# Patient Record
Sex: MALE | Race: WHITE | HISPANIC OR LATINO | URBAN - METROPOLITAN AREA
[De-identification: names, ages, dates, MRNs, and addresses within clinical notes are randomized per-mention and may not be internally consistent; named-entity substitution may affect disease eponyms.]

---

## 2019-06-27 ENCOUNTER — EMERGENCY (EMERGENCY)
Facility: HOSPITAL | Age: 39
LOS: 0 days | Discharge: HOME | End: 2019-06-27
Admitting: EMERGENCY MEDICINE
Payer: MEDICAID

## 2019-06-27 VITALS
HEART RATE: 122 BPM | OXYGEN SATURATION: 98 % | RESPIRATION RATE: 18 BRPM | DIASTOLIC BLOOD PRESSURE: 90 MMHG | TEMPERATURE: 100 F | SYSTOLIC BLOOD PRESSURE: 135 MMHG

## 2019-06-27 DIAGNOSIS — R30.0 DYSURIA: ICD-10-CM

## 2019-06-27 DIAGNOSIS — R31.9 HEMATURIA, UNSPECIFIED: ICD-10-CM

## 2019-06-27 DIAGNOSIS — Z79.2 LONG TERM (CURRENT) USE OF ANTIBIOTICS: ICD-10-CM

## 2019-06-27 DIAGNOSIS — N39.0 URINARY TRACT INFECTION, SITE NOT SPECIFIED: ICD-10-CM

## 2019-06-27 LAB
APPEARANCE UR: ABNORMAL
BACTERIA # UR AUTO: ABNORMAL /HPF
BILIRUB UR-MCNC: ABNORMAL
COLOR SPEC: ABNORMAL
DIFF PNL FLD: ABNORMAL
GLUCOSE UR QL: 100 MG/DL
KETONES UR-MCNC: >=80
LEUKOCYTE ESTERASE UR-ACNC: ABNORMAL
NITRITE UR-MCNC: POSITIVE
PH UR: 5.5 — SIGNIFICANT CHANGE UP (ref 5–8)
PROT UR-MCNC: >=300 MG/DL
RBC CASTS # UR COMP ASSIST: >50 /HPF
SP GR SPEC: 1.02 — SIGNIFICANT CHANGE UP (ref 1.01–1.03)
UROBILINOGEN FLD QL: 2 MG/DL (ref 0.2–0.2)
WBC UR QL: SIGNIFICANT CHANGE UP /HPF

## 2019-06-27 PROCEDURE — 99283 EMERGENCY DEPT VISIT LOW MDM: CPT

## 2019-06-27 RX ORDER — CEFDINIR 250 MG/5ML
1 POWDER, FOR SUSPENSION ORAL
Qty: 20 | Refills: 0
Start: 2019-06-27 | End: 2019-07-06

## 2019-06-27 NOTE — ED PROVIDER NOTE - NS ED ROS FT
Review of Systems:  	•	CONSTITUTIONAL - no fever, no diaphoresis, no chills  	•	SKIN - no rash  	•	EYES - no eye pain, no blurry vision  	•	ENT - no change in hearing, no sore throat, no ear pain or tinnitus  	•	RESPIRATORY - no shortness of breath, no cough  	•	CARDIAC - no chest pain, no palpitations  	•	GI - no abd pain, no nausea, no vomiting, no diarrhea, no constipation  	•	GENITO-URINARY - no discharge, + dysuria; + hematuria, no increased urinary frequency  	•	MUSCULOSKELETAL - no joint paint, no swelling, no redness  	•	NEUROLOGIC - no weakness, no headache, no paresthesias, no LOC  	•

## 2019-06-27 NOTE — ED PROVIDER NOTE - CARE PROVIDER_API CALL
Sheron Garcia)  Urology  [address] [place] [address], [address]  [place] [address]  Phone: [phone]  Fax: [phone]  Follow Up Time:

## 2019-06-27 NOTE — ED PROVIDER NOTE - CLINICAL SUMMARY MEDICAL DECISION MAKING FREE TEXT BOX
+ nitrites and blood on urine, will treat for UTI, and follow up with URO. urine culture sent.. pt given return precautions

## 2019-06-27 NOTE — ED PROVIDER NOTE - NSFOLLOWUPCLINICS_GEN_ALL_ED_FT
[facility]  Urology  .  [place]   Phone: [phone]  Fax:   Follow Up Time:

## 2019-06-27 NOTE — ED PROVIDER NOTE - PHYSICAL EXAMINATION
CONST: Well appearing in NAD  EYES: PERRL, EOMI, Sclera and conjunctiva clear.   ENT: No nasal discharge. TM's clear B/L without drainage. Oropharynx normal appearing, no erythema or exudates.   NECK: Non-tender, no meningeal signs. normal ROM. supple   CARD: Normal S1 S2; Normal rate and rhythm  RESP: Equal BS B/L, No wheezes, rhonchi or rales. No distress  GI: Soft, non-tender, non-distended. no cva tenderness. normal BS  MS: Normal ROM in all extremities. No midline spinal tenderness. pulses 2 +. no calf tenderness or swelling  SKIN: Warm, dry, no acute rashes. Good turgor

## 2019-06-27 NOTE — ED PROVIDER NOTE - OBJECTIVE STATEMENT
38 year old male with no pmhx presents with dysuria and hematuria x 1 day. Pt 38 year old male with no pmhx presents with dysuria and hematuria x 1 day. Pt denies penile discharge, testicular pain or swelling, abdominal or flank pain, N/V/D, fever, chills, urinary frequency or urgency. + sexually active with protection.

## 2019-06-28 PROBLEM — Z00.00 ENCOUNTER FOR PREVENTIVE HEALTH EXAMINATION: Status: ACTIVE | Noted: 2019-06-28

## 2019-06-28 LAB
C TRACH RRNA SPEC QL NAA+PROBE: SIGNIFICANT CHANGE UP
N GONORRHOEA RRNA SPEC QL NAA+PROBE: SIGNIFICANT CHANGE UP
SPECIMEN SOURCE: SIGNIFICANT CHANGE UP

## 2019-06-30 LAB
-  AMIKACIN: SIGNIFICANT CHANGE UP
-  AMPICILLIN/SULBACTAM: SIGNIFICANT CHANGE UP
-  AMPICILLIN: SIGNIFICANT CHANGE UP
-  AZTREONAM: SIGNIFICANT CHANGE UP
-  CEFEPIME: SIGNIFICANT CHANGE UP
-  CEFOTAXIME: SIGNIFICANT CHANGE UP
-  CEFOXITIN: SIGNIFICANT CHANGE UP
-  CEFTAZIDIME: SIGNIFICANT CHANGE UP
-  CEFTRIAXONE: SIGNIFICANT CHANGE UP
-  CEFUROXIME: SIGNIFICANT CHANGE UP
-  CIPROFLOXACIN: SIGNIFICANT CHANGE UP
-  ERTAPENEM: SIGNIFICANT CHANGE UP
-  GENTAMICIN: SIGNIFICANT CHANGE UP
-  IMIPENEM: SIGNIFICANT CHANGE UP
-  LEVOFLOXACIN: SIGNIFICANT CHANGE UP
-  MEROPENEM: SIGNIFICANT CHANGE UP
-  NITROFURANTOIN: SIGNIFICANT CHANGE UP
-  PIPERACILLIN/TAZOBACTAM: SIGNIFICANT CHANGE UP
-  TIGECYCLINE: SIGNIFICANT CHANGE UP
-  TOBRAMYCIN: SIGNIFICANT CHANGE UP
-  TRIMETHOPRIM/SULFAMETHOXAZOLE: SIGNIFICANT CHANGE UP
CULTURE RESULTS: SIGNIFICANT CHANGE UP
METHOD TYPE: SIGNIFICANT CHANGE UP
ORGANISM # SPEC MICROSCOPIC CNT: SIGNIFICANT CHANGE UP
ORGANISM # SPEC MICROSCOPIC CNT: SIGNIFICANT CHANGE UP
SPECIMEN SOURCE: SIGNIFICANT CHANGE UP

## 2019-08-22 ENCOUNTER — APPOINTMENT (OUTPATIENT)
Age: 39
End: 2019-08-22

## 2019-10-17 ENCOUNTER — OUTPATIENT (OUTPATIENT)
Dept: OUTPATIENT SERVICES | Facility: HOSPITAL | Age: 39
LOS: 1 days | Discharge: HOME | End: 2019-10-17

## 2019-10-17 ENCOUNTER — APPOINTMENT (OUTPATIENT)
Age: 39
End: 2019-10-17
Payer: MEDICAID

## 2019-10-17 VITALS
HEIGHT: 71 IN | WEIGHT: 150 LBS | SYSTOLIC BLOOD PRESSURE: 121 MMHG | HEART RATE: 86 BPM | DIASTOLIC BLOOD PRESSURE: 73 MMHG | BODY MASS INDEX: 21 KG/M2

## 2019-10-17 DIAGNOSIS — Z78.9 OTHER SPECIFIED HEALTH STATUS: ICD-10-CM

## 2019-10-17 PROCEDURE — 99203 OFFICE O/P NEW LOW 30 MIN: CPT

## 2019-10-17 NOTE — LETTER HEADER
[FreeTextEntry3] : Sheron Garcia M.D.\par Director of Urology\par [facility]\par [address] [place] [address], [address]\[place] [address]

## 2019-10-17 NOTE — PHYSICAL EXAM
[Normal Appearance] : normal appearance [General Appearance - Well Nourished] : well nourished [General Appearance - Well Developed] : well developed [Well Groomed] : well groomed [General Appearance - In No Acute Distress] : no acute distress [Heart Rate And Rhythm] : Heart rate and rhythm were normal [Edema] : no peripheral edema [Respiration, Rhythm And Depth] : normal respiratory rhythm and effort [Auscultation Breath Sounds / Voice Sounds] : lungs were clear to auscultation bilaterally [Exaggerated Use Of Accessory Muscles For Inspiration] : no accessory muscle use [Bowel Sounds] : normal bowel sounds [Abdomen Soft] : soft [Costovertebral Angle Tenderness] : no ~M costovertebral angle tenderness [Abdomen Tenderness] : non-tender [Urethral Meatus] : meatus normal [Penis Abnormality] : normal circumcised penis [Urinary Bladder Findings] : the bladder was normal on palpation [Scrotum] : the scrotum was normal [Testes Tenderness] : no tenderness of the testes [Testes Mass (___cm)] : there were no testicular masses [Prostate Enlargement] : the prostate was not enlarged [Prostate Tenderness] : the prostate was not tender [No Prostate Nodules] : no prostate nodules [Normal Station and Gait] : the gait and station were normal for the patient's age [] : no rash [No Focal Deficits] : no focal deficits [Oriented To Time, Place, And Person] : oriented to person, place, and time [Affect] : the affect was normal [Mood] : the mood was normal [Not Anxious] : not anxious [No Palpable Adenopathy] : no palpable adenopathy [FreeTextEntry1] : Right small spermatocele. Digital rectal examination shows minimal palpable prostate with good rectal tone and positive BC reflex.

## 2019-10-17 NOTE — ASSESSMENT
[FreeTextEntry1] : He had an episode of painful gross hematuria which resolved on antibiotic therapy. Cultures showed Escherichia coli. He has some minimally bothersome voiding symptoms, including feelings of incomplete bladder emptying. We discussed the options available and he will obtain a renal/bladder ultrasound, keep a voiding diary, and follow for possible uroflow study. Additionally UA C&S will be sent from the office as he is complaining of mild dysuria today.\par Re-the folliculitis pubis I recommended one stop using a razor even on scrotum and two if he’s using a shaver leave more of a piece of hair so it is less likely to curl underneath especially given his ethnic background where the hair tends to be coiled

## 2019-10-17 NOTE — LETTER BODY
[Please see my note below.] : Please see my note below. [Consult Letter:] : I had the pleasure of evaluating your patient, [unfilled]. [Sincerely,] : Sincerely, [Consult Closing:] : Thank you very much for allowing me to participate in the care of this patient.  If you have any questions, please do not hesitate to contact me. [FreeTextEntry2] : To Whom It May Concern

## 2019-10-17 NOTE — HISTORY OF PRESENT ILLNESS
[Dysuria] : dysuria [Hematuria - Gross] : gross hematuria [None] : None [FreeTextEntry1] : Collin is a 39-year-old male, with no significant past medical history, who presented to the ER in June complaining of gross hematuria with dysuria. Urine testing showed nitrite positive urine the patient was given Ceftinir b.i.d. x10 days. Urine culture showed pan sensitive Escherichia coli. His symptoms resolved approximately 2 days after starting antibiotic treatment. Today he presents complaining of some intermittent mild dysuria. He is an [employer]  and frequently holds his urine for long periods of time.\par \par He also reports intermittent feelings of incomplete bladder emptying with occasional decrease force of urinary stream \par \par He denies any history of trauma to the area, catheterizations, surgeries, or  history. He denies renal stones.

## 2019-10-18 DIAGNOSIS — R30.0 DYSURIA: ICD-10-CM

## 2019-10-18 DIAGNOSIS — N39.0 URINARY TRACT INFECTION, SITE NOT SPECIFIED: ICD-10-CM

## 2019-10-18 LAB
APPEARANCE: CLEAR
BILIRUBIN URINE: NEGATIVE
BLOOD URINE: NEGATIVE
COLOR: YELLOW
GLUCOSE QUALITATIVE U: NEGATIVE
KETONES URINE: NEGATIVE
LEUKOCYTE ESTERASE URINE: NEGATIVE
NITRITE URINE: NEGATIVE
PH URINE: 7
PROTEIN URINE: NORMAL
SPECIFIC GRAVITY URINE: 1.02
UROBILINOGEN URINE: NORMAL

## 2019-10-21 LAB — BACTERIA UR CULT: NORMAL

## 2019-10-22 LAB — URINE CYTOLOGY: NORMAL

## 2019-11-05 ENCOUNTER — OUTPATIENT (OUTPATIENT)
Dept: OUTPATIENT SERVICES | Facility: HOSPITAL | Age: 39
LOS: 1 days | Discharge: HOME | End: 2019-11-05
Payer: MEDICAID

## 2019-11-05 DIAGNOSIS — N39.0 URINARY TRACT INFECTION, SITE NOT SPECIFIED: ICD-10-CM

## 2019-11-05 PROCEDURE — 76770 US EXAM ABDO BACK WALL COMP: CPT | Mod: 26

## 2019-12-12 ENCOUNTER — APPOINTMENT (OUTPATIENT)
Dept: UROLOGY | Facility: CLINIC | Age: 39
End: 2019-12-12
Payer: MEDICAID

## 2019-12-12 VITALS
WEIGHT: 150 LBS | SYSTOLIC BLOOD PRESSURE: 129 MMHG | HEIGHT: 71 IN | HEART RATE: 100 BPM | BODY MASS INDEX: 21 KG/M2 | DIASTOLIC BLOOD PRESSURE: 75 MMHG

## 2019-12-12 DIAGNOSIS — Z87.898 PERSONAL HISTORY OF OTHER SPECIFIED CONDITIONS: ICD-10-CM

## 2019-12-12 DIAGNOSIS — A49.9 URINARY TRACT INFECTION, SITE NOT SPECIFIED: ICD-10-CM

## 2019-12-12 DIAGNOSIS — R39.12 POOR URINARY STREAM: ICD-10-CM

## 2019-12-12 DIAGNOSIS — N39.0 URINARY TRACT INFECTION, SITE NOT SPECIFIED: ICD-10-CM

## 2019-12-12 PROCEDURE — 99213 OFFICE O/P EST LOW 20 MIN: CPT

## 2019-12-12 PROCEDURE — 51741 ELECTRO-UROFLOWMETRY FIRST: CPT

## 2019-12-12 PROCEDURE — 51798 US URINE CAPACITY MEASURE: CPT

## 2019-12-12 NOTE — LETTER HEADER
[FreeTextEntry3] : Sheron Garcia M.D.\par Director of Urology\par [facility]\par [address] [place] [address], [address]\[place] [address]

## 2019-12-12 NOTE — HISTORY OF PRESENT ILLNESS
[Dysuria] : dysuria [Hematuria - Gross] : gross hematuria [None] : None [FreeTextEntry1] : Collin is a 39-year-old male, with no significant past medical history, who presented to the ER in June complaining of gross hematuria with dysuria. Urine testing showed nitrite positive urine the patient was given Ceftinir b.i.d. x10 days. \par \par Post antibiotic use he states his symptoms have resolved. He reports pushing/straining to initiate a stream however, once he starts it comes out easy. He admits to diminished force of stream.\par \par He presents today to review his uroflow study, post and urine cultures, and renal/bladder ultrasound. He did not keep a voiding diary. He is an [employer]  and frequently holds his urine for long periods of time.\par

## 2019-12-12 NOTE — LETTER BODY
[Please see my note below.] : Please see my note below. [Sincerely,] : Sincerely, [Courtesy Letter:] : I had the pleasure of seeing your patient, [unfilled], in my office today. [FreeTextEntry2] : To Whom It May Concern

## 2019-12-12 NOTE — PHYSICAL EXAM
[Normal Appearance] : normal appearance [General Appearance - Well Developed] : well developed [General Appearance - Well Nourished] : well nourished [General Appearance - In No Acute Distress] : no acute distress [Edema] : no peripheral edema [Well Groomed] : well groomed [] : no respiratory distress [Respiration, Rhythm And Depth] : normal respiratory rhythm and effort [Exaggerated Use Of Accessory Muscles For Inspiration] : no accessory muscle use [Mood] : the mood was normal [Affect] : the affect was normal [Oriented To Time, Place, And Person] : oriented to person, place, and time [Not Anxious] : not anxious [No Focal Deficits] : no focal deficits [Normal Station and Gait] : the gait and station were normal for the patient's age

## 2019-12-12 NOTE — ASSESSMENT
[FreeTextEntry1] : His renal/bladder ultrasound are unremarkable and his post antibiotic urine culture is also negative. His symptoms have mostly resolved however he has some pushing to start urination. Question is why did he get a UTI in the first place.\par He did not bring in a record of his intake and output so I can’t say if any of this secondary to behavior modification. We went ahead with the uroflow anyway, and his uroflow study is mostly within normal limits however there is some minimally decreased force of stream.\par He works as an [employer]  and holds his urine for long periods of time. He may have trained himself to hold his urine and now has trouble not. We recommended keeping a voiding diary now for 24 hours. He would then start performing reverse Kegel exercises, and keep a voiding diary 4 weeks after. He'll follow up in 6 weeks for discussion

## 2019-12-14 ENCOUNTER — EMERGENCY (EMERGENCY)
Facility: HOSPITAL | Age: 39
LOS: 0 days | Discharge: HOME | End: 2019-12-14
Admitting: EMERGENCY MEDICINE
Payer: MEDICAID

## 2019-12-14 VITALS
RESPIRATION RATE: 18 BRPM | WEIGHT: 149.91 LBS | TEMPERATURE: 98 F | HEART RATE: 100 BPM | SYSTOLIC BLOOD PRESSURE: 136 MMHG | DIASTOLIC BLOOD PRESSURE: 104 MMHG | OXYGEN SATURATION: 100 %

## 2019-12-14 DIAGNOSIS — T78.1XXA OTHER ADVERSE FOOD REACTIONS, NOT ELSEWHERE CLASSIFIED, INITIAL ENCOUNTER: ICD-10-CM
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
## 2019-12-14 DIAGNOSIS — R21 RASH AND OTHER NONSPECIFIC SKIN ERUPTION: ICD-10-CM
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
## 2019-12-14 PROCEDURE — 99283 EMERGENCY DEPT VISIT LOW MDM: CPT

## 2019-12-14 RX ORDER — FAMOTIDINE 10 MG/ML
40 INJECTION INTRAVENOUS ONCE
Refills: 0 | Status: COMPLETED | OUTPATIENT
Start: 2019-12-14 | End: 2019-12-14

## 2019-12-14 RX ORDER — EPINEPHRINE 0.3 MG/.3ML
0.3 INJECTION INTRAMUSCULAR; SUBCUTANEOUS
Qty: 1 | Refills: 0
Start: 2019-12-14 | End: 2019-12-14

## 2019-12-14 RX ORDER — DIPHENHYDRAMINE HCL 50 MG
50 CAPSULE ORAL ONCE
Refills: 0 | Status: COMPLETED | OUTPATIENT
Start: 2019-12-14 | End: 2019-12-14

## 2019-12-14 RX ADMIN — Medication 50 MILLIGRAM(S): at 01:13

## 2019-12-14 RX ADMIN — Medication 60 MILLIGRAM(S): at 01:13

## 2019-12-14 RX ADMIN — FAMOTIDINE 40 MILLIGRAM(S): 10 INJECTION INTRAVENOUS at 01:13

## 2019-12-14 NOTE — ED PROVIDER NOTE - NS ED ROS FT
Review of Systems:  	•	CONSTITUTIONAL - no fever, no diaphoresis, no chills  	•	SKIN - + rash  	•	HEMATOLOGIC - no bleeding, no bruising  	•	EYES - no eye pain, no blurry vision  	•	ENT - no change in hearing, no sore throat, no ear pain or tinnitus  	•	RESPIRATORY - no shortness of breath, no cough  	•	CARDIAC - no chest pain, no palpitations  	•	GI - no abd pain, no nausea, no vomiting, no diarrhea, no constipation  	•	GENITO-URINARY - no discharge, no dysuria; no hematuria, no increased urinary frequency  	•	MUSCULOSKELETAL - no joint paint, no swelling, no redness

## 2019-12-14 NOTE — ED PROVIDER NOTE - PATIENT PORTAL LINK FT
You can access the FollowMyHealth Patient Portal offered by [facility] by registering at the following website: [email or web address]. By joining [employer]’s FollowMyHealth portal, you will also be able to view your health information using other applications (apps) compatible with our system.

## 2019-12-14 NOTE — ED PROVIDER NOTE - PHYSICAL EXAMINATION
CONST: Well appearing in NAD  EYES: PERRL, EOMI, Sclera and conjunctiva clear.   ENT: No nasal discharge. Oropharynx normal appearing, no erythema or exudates. No abscess or swelling. Uvula midline.   NECK: Non-tender, no meningeal signs. normal ROM. supple   CARD: Normal S1 S2; Normal rate and rhythm  RESP: Equal BS B/L, No wheezes, rhonchi or rales. No distress  GI: Soft, non-tender, non-distended. no cva tenderness. normal BS  MS: Normal ROM in all extremities. No midline spinal tenderness. pulses 2 +  SKIN: + urticarial rash to face, neck, abdomen and back

## 2019-12-14 NOTE — ED PROVIDER NOTE - OBJECTIVE STATEMENT
39 year old male with pmhx noted, presents with hives to body x 1 hour. pt admits hives began after eating shrimp, which is is allergic to. pt denies throat tightness, swelling, cp, sob, abd pain, or N/V/D.

## 2019-12-14 NOTE — ED PROVIDER NOTE - CLINICAL SUMMARY MEDICAL DECISION MAKING FREE TEXT BOX
Pt with allergic reaction to shrimp, + hives. medications given with relief. pt discharged with prednisone and EPI-PEN. strict return precautions given to pt

## 2019-12-14 NOTE — ED PROVIDER NOTE - PROGRESS NOTE DETAILS
Pt feeling much better after medications, pt given strict return precautions. prednisone and EPI-PEN sent to pharmacy. pt understands to return to ED if symptoms do not resolve or worsen

## 2019-12-14 NOTE — ED ADULT TRIAGE NOTE - CHIEF COMPLAINT QUOTE
Pt came c/o allergic reaction after eating shrimps. Pt stated he has allergy to shellfish and eats shrimps sometimes but almost never gets any reaction. Pt stated he took Zyrtec.

## 2020-01-09 ENCOUNTER — OUTPATIENT (OUTPATIENT)
Dept: OUTPATIENT SERVICES | Facility: HOSPITAL | Age: 40
LOS: 1 days | Discharge: HOME | End: 2020-01-09

## 2020-01-22 ENCOUNTER — APPOINTMENT (OUTPATIENT)
Dept: UROLOGY | Facility: CLINIC | Age: 40
End: 2020-01-22

## 2020-12-21 ENCOUNTER — EMERGENCY (EMERGENCY)
Facility: HOSPITAL | Age: 40
LOS: 0 days | Discharge: HOME | End: 2020-12-21
Attending: EMERGENCY MEDICINE | Admitting: EMERGENCY MEDICINE
Payer: MEDICAID

## 2020-12-21 VITALS
SYSTOLIC BLOOD PRESSURE: 140 MMHG | OXYGEN SATURATION: 99 % | WEIGHT: 149.91 LBS | DIASTOLIC BLOOD PRESSURE: 80 MMHG | RESPIRATION RATE: 18 BRPM | HEART RATE: 118 BPM | TEMPERATURE: 98 F

## 2020-12-21 VITALS — RESPIRATION RATE: 18 BRPM | HEART RATE: 86 BPM | OXYGEN SATURATION: 98 %

## 2020-12-21 DIAGNOSIS — R30.0 DYSURIA: ICD-10-CM

## 2020-12-21 DIAGNOSIS — Z91.013 ALLERGY TO SEAFOOD: ICD-10-CM

## 2020-12-21 DIAGNOSIS — Z91.09 OTHER ALLERGY STATUS, OTHER THAN TO DRUGS AND BIOLOGICAL SUBSTANCES: ICD-10-CM

## 2020-12-21 LAB
APPEARANCE UR: CLEAR — SIGNIFICANT CHANGE UP
BILIRUB UR-MCNC: NEGATIVE — SIGNIFICANT CHANGE UP
COLOR SPEC: YELLOW — SIGNIFICANT CHANGE UP
DIFF PNL FLD: NEGATIVE — SIGNIFICANT CHANGE UP
GLUCOSE UR QL: NEGATIVE — SIGNIFICANT CHANGE UP
KETONES UR-MCNC: NEGATIVE — SIGNIFICANT CHANGE UP
LEUKOCYTE ESTERASE UR-ACNC: NEGATIVE — SIGNIFICANT CHANGE UP
NITRITE UR-MCNC: NEGATIVE — SIGNIFICANT CHANGE UP
PH UR: 6.5 — SIGNIFICANT CHANGE UP (ref 5–8)
PROT UR-MCNC: SIGNIFICANT CHANGE UP
SP GR SPEC: 1.02 — SIGNIFICANT CHANGE UP (ref 1.01–1.03)
UROBILINOGEN FLD QL: SIGNIFICANT CHANGE UP

## 2020-12-21 PROCEDURE — 99283 EMERGENCY DEPT VISIT LOW MDM: CPT

## 2020-12-21 NOTE — ED PROVIDER NOTE - PROGRESS NOTE DETAILS
Attending Note: 39 y/o M here for dysuria. Pt has had a UTI in the past and states this feel similar to another UTI with dysuria. Denies any discharge, fever, abd pain, or back pain. States that he has not been sexually active in over a year.  PE: ABD: soft, NT, no CVA tenderness.  exam preformed with MARTA Hurst: No testicular tenderness, normal circumcised male, no discharge. Impression: Dysuria. Plan: UA, urine culture. Pt here with dysuria. Urine within normal limits. Vital signs improved. Will d/c. Culture sent. Attending Note: 41 y/o M here for dysuria. Pt has had a UTI in the past and states this feel similar to another UTI. Pt reports dysuria. Denies any discharge, fever, abd pain, or back pain. States that he has not been sexually active in over a year.  PE: ABD: soft, NT, no CVA tenderness.  exam preformed with MARTA Hurst: No testicular tenderness, normal circumcised male, no discharge. Impression: Dysuria. Plan: UA, urine culture.

## 2020-12-21 NOTE — ED PROVIDER NOTE - OBJECTIVE STATEMENT
41 yo male, no pmh, presents to ed for dysuria, on and off 41 yo male, no pmh, presents to ed for dysuria, on and off, several days, had uti in past similar, burning, no specific pain or radiation. Denies sexual activity, testicular pain, abd pain, nv, hematuria, back pain.

## 2020-12-21 NOTE — ED PROVIDER NOTE - PATIENT PORTAL LINK FT
You can access the FollowMyHealth Patient Portal offered by [facility] by registering at the following website: [email or web address]. By joining [employer]’s FollowMyHealth portal, you will also be able to view your health information using other applications (apps) compatible with our system.

## 2020-12-21 NOTE — ED PROVIDER NOTE - CARE PROVIDER_API CALL
Ruy Mcdonald)  Urology  [address] [place] [address], [address]  [place] [address]  Phone: [phone]  Fax: [phone]  Follow Up Time: 1-3 Days

## 2020-12-21 NOTE — ED PROVIDER NOTE - PHYSICAL EXAMINATION
Physical Exam    Vital Signs: I have reviewed the initial vital signs.  Constitutional: well-nourished, appears stated age, no acute distress  Eyes: Conjunctiva pink, Sclera clear,   Cardiovascular: S1 and S2, regular rate, regular rhythm, well-perfused extremities, radial pulses equal and 2+  Respiratory: unlabored respiratory effort, clear to auscultation bilaterally no wheezing, rales and rhonchi  Gastrointestinal: soft, non-tender abdomen, no pulsatile mass, normal bowl sounds, no cvat  Musculoskeletal: supple neck, no lower extremity edema, no midline tenderness  Integumentary: warm, dry, no rash  Neurologic: awake, alert, nvi Physical Exam    Vital Signs: I have reviewed the initial vital signs.  Constitutional: well-nourished, appears stated age, no acute distress  Eyes: Conjunctiva pink, Sclera clear,   Cardiovascular: S1 and S2, regular rate, regular rhythm, well-perfused extremities, radial pulses equal and 2+  Respiratory: unlabored respiratory effort, clear to auscultation bilaterally no wheezing, rales and rhonchi  Gastrointestinal: soft, non-tender abdomen, no pulsatile mass, normal bowl sounds, no cvat  Musculoskeletal: supple neck, no lower extremity edema, no midline tenderness  Integumentary: warm, dry, no rash  Neurologic: awake, alert, nvi.

## 2020-12-21 NOTE — ED PROVIDER NOTE - NS ED ROS FT
Constitutional: (-) fever, (-) chills  Cardiovascular: (-) chest pain  Respiratory: (-) cough,  Gastrointestinal: (-) vomiting,  Musculoskeletal:  (-) back pain  Integumentary: (-) rash,  Neurological: (-) headache,   :  (+)dysuria,  (-) hematuria  Allergic/Immunologic: (-) pruritus

## 2020-12-22 LAB
C TRACH RRNA SPEC QL NAA+PROBE: SIGNIFICANT CHANGE UP
CULTURE RESULTS: NO GROWTH — SIGNIFICANT CHANGE UP
N GONORRHOEA RRNA SPEC QL NAA+PROBE: SIGNIFICANT CHANGE UP
SPECIMEN SOURCE: SIGNIFICANT CHANGE UP
SPECIMEN SOURCE: SIGNIFICANT CHANGE UP

## 2021-04-30 ENCOUNTER — OUTPATIENT (OUTPATIENT)
Dept: OUTPATIENT SERVICES | Facility: HOSPITAL | Age: 41
LOS: 1 days | Discharge: HOME | End: 2021-04-30

## 2021-04-30 PROBLEM — Z78.9 OTHER SPECIFIED HEALTH STATUS: Chronic | Status: ACTIVE | Noted: 2020-12-21

## 2022-02-22 ENCOUNTER — OUTPATIENT (OUTPATIENT)
Dept: OUTPATIENT SERVICES | Facility: HOSPITAL | Age: 42
LOS: 1 days | Discharge: HOME | End: 2022-02-22

## 2022-07-27 NOTE — ED PROVIDER NOTE - NS ED ATTENDING STATEMENT MOD
I have personally performed a face to face diagnostic evaluation on this patient. I have reviewed the ACP note and agree with the history, exam and plan of care, except as noted. Imiquimod Counseling:  I discussed with the patient the risks of imiquimod including but not limited to erythema, scaling, itching, weeping, crusting, and pain.  Patient understands that the inflammatory response to imiquimod is variable from person to person and was educated regarded proper titration schedule.  If flu-like symptoms develop, patient knows to discontinue the medication and contact us.

## 2022-10-17 ENCOUNTER — EMERGENCY (EMERGENCY)
Facility: HOSPITAL | Age: 42
LOS: 0 days | Discharge: HOME | End: 2022-10-17
Attending: EMERGENCY MEDICINE | Admitting: EMERGENCY MEDICINE

## 2022-10-17 VITALS
OXYGEN SATURATION: 98 % | HEART RATE: 110 BPM | TEMPERATURE: 99 F | DIASTOLIC BLOOD PRESSURE: 94 MMHG | RESPIRATION RATE: 18 BRPM | WEIGHT: 179.9 LBS | SYSTOLIC BLOOD PRESSURE: 137 MMHG

## 2022-10-17 VITALS — HEART RATE: 76 BPM

## 2022-10-17 DIAGNOSIS — K92.1 MELENA: ICD-10-CM

## 2022-10-17 DIAGNOSIS — J30.89 OTHER ALLERGIC RHINITIS: ICD-10-CM

## 2022-10-17 DIAGNOSIS — Z91.013 ALLERGY TO SEAFOOD: ICD-10-CM

## 2022-10-17 DIAGNOSIS — R19.7 DIARRHEA, UNSPECIFIED: ICD-10-CM

## 2022-10-17 DIAGNOSIS — R00.0 TACHYCARDIA, UNSPECIFIED: ICD-10-CM

## 2022-10-17 LAB
ALBUMIN SERPL ELPH-MCNC: 5 G/DL — SIGNIFICANT CHANGE UP (ref 3.5–5.2)
ALP SERPL-CCNC: 57 U/L — SIGNIFICANT CHANGE UP (ref 30–115)
ALT FLD-CCNC: 10 U/L — SIGNIFICANT CHANGE UP (ref 0–41)
ANION GAP SERPL CALC-SCNC: 8 MMOL/L — SIGNIFICANT CHANGE UP (ref 7–14)
APTT BLD: 27 SEC — SIGNIFICANT CHANGE UP (ref 27–39.2)
AST SERPL-CCNC: 17 U/L — SIGNIFICANT CHANGE UP (ref 0–41)
BASOPHILS # BLD AUTO: 0.03 K/UL — SIGNIFICANT CHANGE UP (ref 0–0.2)
BASOPHILS NFR BLD AUTO: 0.3 % — SIGNIFICANT CHANGE UP (ref 0–1)
BILIRUB DIRECT SERPL-MCNC: <0.2 MG/DL — SIGNIFICANT CHANGE UP (ref 0–0.3)
BILIRUB INDIRECT FLD-MCNC: >0.3 MG/DL — SIGNIFICANT CHANGE UP (ref 0.2–1.2)
BILIRUB SERPL-MCNC: 0.5 MG/DL — SIGNIFICANT CHANGE UP (ref 0.2–1.2)
BUN SERPL-MCNC: 10 MG/DL — SIGNIFICANT CHANGE UP (ref 10–20)
CALCIUM SERPL-MCNC: 9.3 MG/DL — SIGNIFICANT CHANGE UP (ref 8.4–10.5)
CHLORIDE SERPL-SCNC: 101 MMOL/L — SIGNIFICANT CHANGE UP (ref 98–110)
CO2 SERPL-SCNC: 29 MMOL/L — SIGNIFICANT CHANGE UP (ref 17–32)
CREAT SERPL-MCNC: 0.9 MG/DL — SIGNIFICANT CHANGE UP (ref 0.7–1.5)
EGFR: 111 ML/MIN/1.73M2 — SIGNIFICANT CHANGE UP
EOSINOPHIL # BLD AUTO: 0.04 K/UL — SIGNIFICANT CHANGE UP (ref 0–0.7)
EOSINOPHIL NFR BLD AUTO: 0.4 % — SIGNIFICANT CHANGE UP (ref 0–8)
GLUCOSE SERPL-MCNC: 93 MG/DL — SIGNIFICANT CHANGE UP (ref 70–99)
HCT VFR BLD CALC: 42.1 % — SIGNIFICANT CHANGE UP (ref 42–52)
HGB BLD-MCNC: 14.7 G/DL — SIGNIFICANT CHANGE UP (ref 14–18)
IMM GRANULOCYTES NFR BLD AUTO: 0.3 % — SIGNIFICANT CHANGE UP (ref 0.1–0.3)
INR BLD: 1.03 RATIO — SIGNIFICANT CHANGE UP (ref 0.65–1.3)
LIDOCAIN IGE QN: 17 U/L — SIGNIFICANT CHANGE UP (ref 7–60)
LYMPHOCYTES # BLD AUTO: 1.18 K/UL — LOW (ref 1.2–3.4)
LYMPHOCYTES # BLD AUTO: 12.1 % — LOW (ref 20.5–51.1)
MCHC RBC-ENTMCNC: 31.5 PG — HIGH (ref 27–31)
MCHC RBC-ENTMCNC: 34.9 G/DL — SIGNIFICANT CHANGE UP (ref 32–37)
MCV RBC AUTO: 90.3 FL — SIGNIFICANT CHANGE UP (ref 80–94)
MONOCYTES # BLD AUTO: 0.61 K/UL — HIGH (ref 0.1–0.6)
MONOCYTES NFR BLD AUTO: 6.3 % — SIGNIFICANT CHANGE UP (ref 1.7–9.3)
NEUTROPHILS # BLD AUTO: 7.84 K/UL — HIGH (ref 1.4–6.5)
NEUTROPHILS NFR BLD AUTO: 80.6 % — HIGH (ref 42.2–75.2)
NRBC # BLD: 0 /100 WBCS — SIGNIFICANT CHANGE UP (ref 0–0)
PLATELET # BLD AUTO: 396 K/UL — SIGNIFICANT CHANGE UP (ref 130–400)
POTASSIUM SERPL-MCNC: 4.4 MMOL/L — SIGNIFICANT CHANGE UP (ref 3.5–5)
POTASSIUM SERPL-SCNC: 4.4 MMOL/L — SIGNIFICANT CHANGE UP (ref 3.5–5)
PROT SERPL-MCNC: 7.2 G/DL — SIGNIFICANT CHANGE UP (ref 6–8)
PROTHROM AB SERPL-ACNC: 11.8 SEC — SIGNIFICANT CHANGE UP (ref 9.95–12.87)
RBC # BLD: 4.66 M/UL — LOW (ref 4.7–6.1)
RBC # FLD: 12.6 % — SIGNIFICANT CHANGE UP (ref 11.5–14.5)
SODIUM SERPL-SCNC: 138 MMOL/L — SIGNIFICANT CHANGE UP (ref 135–146)
WBC # BLD: 9.73 K/UL — SIGNIFICANT CHANGE UP (ref 4.8–10.8)
WBC # FLD AUTO: 9.73 K/UL — SIGNIFICANT CHANGE UP (ref 4.8–10.8)

## 2022-10-17 PROCEDURE — 99284 EMERGENCY DEPT VISIT MOD MDM: CPT

## 2022-10-17 RX ORDER — SODIUM CHLORIDE 9 MG/ML
1000 INJECTION INTRAMUSCULAR; INTRAVENOUS; SUBCUTANEOUS ONCE
Refills: 0 | Status: COMPLETED | OUTPATIENT
Start: 2022-10-17 | End: 2022-10-17

## 2022-10-17 RX ADMIN — SODIUM CHLORIDE 1000 MILLILITER(S): 9 INJECTION INTRAMUSCULAR; INTRAVENOUS; SUBCUTANEOUS at 03:11

## 2022-10-17 NOTE — ED PROVIDER NOTE - NSFOLLOWUPCLINICS_GEN_ALL_ED_FT
[facility]  Medicine  [address], [place]   Phone: [phone]  Fax:   Follow Up Time: 4-6 Days

## 2022-10-17 NOTE — ED PROVIDER NOTE - CARE PLAN
1 Principal Discharge DX:	Blood in stool  Assessment and plan of treatment:	Patient with scant blood in his stool at the end of diarrhea.  Abdomen is benign now so do not feel needs emergent imaging and has no abdominal pain.  Will check labs.  Minimally tachycardic but no consistent bleeding so doubt that this is from clinically significant GI bleed.  If heart rate improves and labs reassuring, feel he is likely stable for outpatient GI follow-up.  Like related to the amount of diarrhea that he had.

## 2022-10-17 NOTE — ED PROVIDER NOTE - NSFOLLOWUPINSTRUCTIONS_ED_ALL_ED_FT
Our Emergency Department Referral Coordinators will be reaching out ot you in the next 24-48 hours from 9:00am to 5:00pm (Monday to Friday) with a follow up appointment. Please expect a phone call from the hospital in that time frame. If you do not receive a call or if you have any questions or concerns, you can reach them at [phone] or [phone].     Follow up with your primary care doctor within 1 week.       Please return to the emergency department if you have new/changed/worsening abdominal pain, abdominal swelling, inability to tolerate food or drink, nausea, vomiting, bloody stool, bloody diarrhea, fevers >100.4 or other symptoms that you find concerning.

## 2022-10-17 NOTE — ED PROVIDER NOTE - PLAN OF CARE
Patient with scant blood in his stool at the end of diarrhea.  Abdomen is benign now so do not feel needs emergent imaging and has no abdominal pain.  Will check labs.  Minimally tachycardic but no consistent bleeding so doubt that this is from clinically significant GI bleed.  If heart rate improves and labs reassuring, feel he is likely stable for outpatient GI follow-up.  Like related to the amount of diarrhea that he had.

## 2022-10-17 NOTE — ED PROVIDER NOTE - PHYSICAL EXAMINATION
on exam, nontoxic, vs noted  Gen: Alert, NAD  Skin: Warm, dry, intact  Head: NCAT  ENT: Mucous membranes moist  Neck: Supple  CV: RRR, normal S1, S2, no m/r/g  Resp: Non labored respirations, Lungs CTA b/l, no wheezes, rales, rhonchi  Abdomen: Soft, nondistended, nontender  Rectal: No impaction, no masses, no melena or brb, no fissures, normal tone, no fluctuance, chaperoned by KISHA Yepez  Extremities: Moving all extremities, no edema  Neuro: No focal neuro deficits  Psych: Cooperative

## 2022-10-17 NOTE — ED ADULT TRIAGE NOTE - CHIEF COMPLAINT QUOTE
pt c/o lower abdominal pain that was relieved after one episode of diarrhea "that had some blood in it" earlier today; denies any pain at this time

## 2022-10-17 NOTE — ED PROVIDER NOTE - CLINICAL SUMMARY MEDICAL DECISION MAKING FREE TEXT BOX
mild blood in stool, resolved. labs and VS stable. In my opinion, based on current evaluation and results, an acute medical or surgical emergency does not appear to be occurring at this time and I feel that the pt is stable for further outpatient work up and/or treatment. Return precautions discussed. referred top GI

## 2022-10-17 NOTE — ED PROVIDER NOTE - OBJECTIVE STATEMENT
42-year-old male with no significant past medical history presents with concern about blood in his stool.  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  States that he was unable to go to the bathroom but when he finally got back to the station had explosive diarrhea.  States that at the end he had some mucousy stool with scant blood in it.  Has not had significant blood again.  No more abdominal pain.  Feels asymptomatic now.  No nausea or vomiting.  Had 1 episode of blood in the stool couple months ago but otherwise no issues.  Has not followed up for this.  Denies any urinary symptoms. no recent abx.

## 2022-10-17 NOTE — ED PROVIDER NOTE - PATIENT PORTAL LINK FT
You can access the FollowMyHealth Patient Portal offered by [facility] by registering at the following website: [email or web address]. By joining [employer]’s FollowMyHealth portal, you will also be able to view your health information using other applications (apps) compatible with our system.

## 2022-12-12 NOTE — ED PROVIDER NOTE - CHILD ABUSE FACILITY
[facility] Bleeding that does not stop/Swelling that gets worse/Pain not relieved by Medications/Fever greater than (need to indicate Fahrenheit or Celsius)/Wound/Surgical Site with redness, or foul smelling discharge or pus/Numbness, tingling, color or temperature change to extremity/Nausea and vomiting that does not stop/Unable to urinate/Inability to tolerate liquids or foods

## 2023-02-21 ENCOUNTER — OUTPATIENT (OUTPATIENT)
Dept: OUTPATIENT SERVICES | Facility: HOSPITAL | Age: 43
LOS: 1 days | End: 2023-02-21
Payer: COMMERCIAL

## 2023-02-21 ENCOUNTER — APPOINTMENT (OUTPATIENT)
Dept: GASTROENTEROLOGY | Facility: CLINIC | Age: 43
End: 2023-02-21
Payer: COMMERCIAL

## 2023-02-21 ENCOUNTER — APPOINTMENT (OUTPATIENT)
Dept: GASTROENTEROLOGY | Facility: CLINIC | Age: 43
End: 2023-02-21

## 2023-02-21 VITALS
TEMPERATURE: 97.2 F | HEART RATE: 80 BPM | HEIGHT: 71 IN | WEIGHT: 155 LBS | OXYGEN SATURATION: 98 % | BODY MASS INDEX: 21.7 KG/M2 | DIASTOLIC BLOOD PRESSURE: 78 MMHG | SYSTOLIC BLOOD PRESSURE: 142 MMHG

## 2023-02-21 DIAGNOSIS — K92.1 MELENA: ICD-10-CM

## 2023-02-21 DIAGNOSIS — Z00.00 ENCOUNTER FOR GENERAL ADULT MEDICAL EXAMINATION WITHOUT ABNORMAL FINDINGS: ICD-10-CM

## 2023-02-21 PROCEDURE — 99203 OFFICE O/P NEW LOW 30 MIN: CPT | Mod: GC

## 2023-02-21 PROCEDURE — 99203 OFFICE O/P NEW LOW 30 MIN: CPT

## 2023-02-21 NOTE — PHYSICAL EXAM

## 2023-03-03 PROBLEM — K92.1 HEMATOCHEZIA: Status: ACTIVE | Noted: 2023-03-03

## 2023-03-03 NOTE — ASSESSMENT
[FreeTextEntry1] : 41 y/o male with no prior PMHx who presented for evaluation of painless hematochezia in the setting of constipation. \par no family hx of Colon Cancer or GI malignancy. 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

## 2023-03-03 NOTE — HISTORY OF PRESENT ILLNESS
[FreeTextEntry1] : Patient is a 41 y/o male with no prior  PMHx who presented for evaluation of blood in stool. 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 This incident happened weeks ago and has not occurred since.
